# Patient Record
Sex: FEMALE | URBAN - METROPOLITAN AREA
[De-identification: names, ages, dates, MRNs, and addresses within clinical notes are randomized per-mention and may not be internally consistent; named-entity substitution may affect disease eponyms.]

---

## 2018-08-28 ENCOUNTER — APPOINTMENT (RX ONLY)
Dept: URBAN - METROPOLITAN AREA CLINIC 141 | Facility: CLINIC | Age: 58
Setting detail: DERMATOLOGY
End: 2018-08-28

## 2018-08-28 DIAGNOSIS — Z41.9 ENCOUNTER FOR PROCEDURE FOR PURPOSES OTHER THAN REMEDYING HEALTH STATE, UNSPECIFIED: ICD-10-CM

## 2018-08-28 PROCEDURE — ? MICRODERMABRASION

## 2018-08-28 ASSESSMENT — LOCATION ZONE DERM
LOCATION ZONE: NECK
LOCATION ZONE: FACE
LOCATION ZONE: NOSE

## 2018-08-28 ASSESSMENT — LOCATION DETAILED DESCRIPTION DERM
LOCATION DETAILED: LEFT SUPERIOR ANTERIOR NECK
LOCATION DETAILED: NASAL SUPRATIP
LOCATION DETAILED: LEFT CHIN
LOCATION DETAILED: INFERIOR MID FOREHEAD

## 2018-08-28 ASSESSMENT — LOCATION SIMPLE DESCRIPTION DERM
LOCATION SIMPLE: CHIN
LOCATION SIMPLE: NOSE
LOCATION SIMPLE: INFERIOR FOREHEAD
LOCATION SIMPLE: LEFT ANTERIOR NECK

## 2018-08-28 NOTE — PROCEDURE: MICRODERMABRASION
Consent: Written consent obtained, risks reviewed including but not limited to crusting, scabbing, blistering, scarring, darker or lighter pigmentary change, bruising, and/or incomplete response.
Post-Care Instructions: I reviewed with the patient in detail post-care instructions. Patient should stay away from the sun and wear sun protection until treated areas are fully healed. Pt also aware no AHA's, Retin A or scrubs for 72 hours. Pt instructed to use spf 30+ every day.
Number Of Passes: 3
Wand: Medium
Detail Level: Zone
Indication: skin texture
Vacuum Pressure Units: inches Hg
Endpoint: mild erythema
Vacuum Pressure: 15
Prep Text: The patients skin was cleaned and prepped. Toner used to decrease Skin. First pass used to remove first layer of dead skin cells. Second and third passes used to treat trouble areas and to remove outermost layer of skin. \\nMicrodermabrasion endpoint. Applied SPF after.
Treatment Number: 1

## 2019-01-18 ENCOUNTER — APPOINTMENT (RX ONLY)
Dept: URBAN - METROPOLITAN AREA CLINIC 141 | Facility: CLINIC | Age: 59
Setting detail: DERMATOLOGY
End: 2019-01-18

## 2019-01-18 DIAGNOSIS — Z41.9 ENCOUNTER FOR PROCEDURE FOR PURPOSES OTHER THAN REMEDYING HEALTH STATE, UNSPECIFIED: ICD-10-CM

## 2019-01-18 PROCEDURE — ? MICRODERMABRASION

## 2019-01-18 ASSESSMENT — LOCATION SIMPLE DESCRIPTION DERM
LOCATION SIMPLE: LEFT FOREHEAD
LOCATION SIMPLE: RIGHT ANTERIOR NECK

## 2019-01-18 ASSESSMENT — LOCATION DETAILED DESCRIPTION DERM
LOCATION DETAILED: LEFT MEDIAL FOREHEAD
LOCATION DETAILED: RIGHT SUPERIOR ANTERIOR NECK

## 2019-01-18 ASSESSMENT — LOCATION ZONE DERM
LOCATION ZONE: FACE
LOCATION ZONE: NECK

## 2019-01-18 NOTE — PROCEDURE: MICRODERMABRASION
Number Of Passes: 3
Treatment Number: 2
Vacuum Pressure Units: inches Hg
Consent: Written consent obtained, risks reviewed including but not limited to crusting, scabbing, blistering, scarring, darker or lighter pigmentary change, bruising, and/or incomplete response.
Prep Text: The patients skin was cleaned and prepped. Toner used to decrease Skin. First pass used to remove first layer of dead skin cells. Second and third passes used to treat trouble areas and to remove outermost layer of skin. \\nMicrodermabrasion endpoint. Applied SPF after.
Vacuum Pressure: 15
Wand: Medium
Post-Care Instructions: I reviewed with the patient in detail post-care instructions. Patient should stay away from the sun and wear sun protection until treated areas are fully healed. Pt also aware no AHA's, Retin A or scrubs for 72 hours. Pt instructed to use spf 30+ every day.
Detail Level: Zone
Endpoint: mild erythema
Indication: skin texture

## 2024-09-27 ENCOUNTER — APPOINTMENT (RX ONLY)
Dept: URBAN - METROPOLITAN AREA CLINIC 142 | Facility: CLINIC | Age: 64
Setting detail: DERMATOLOGY
End: 2024-09-27

## 2024-09-27 DIAGNOSIS — L92.0 GRANULOMA ANNULARE: ICD-10-CM

## 2024-09-27 DIAGNOSIS — L57.8 OTHER SKIN CHANGES DUE TO CHRONIC EXPOSURE TO NONIONIZING RADIATION: ICD-10-CM

## 2024-09-27 DIAGNOSIS — D18.0 HEMANGIOMA: ICD-10-CM

## 2024-09-27 DIAGNOSIS — D22 MELANOCYTIC NEVI: ICD-10-CM

## 2024-09-27 DIAGNOSIS — L81.4 OTHER MELANIN HYPERPIGMENTATION: ICD-10-CM

## 2024-09-27 DIAGNOSIS — L82.1 OTHER SEBORRHEIC KERATOSIS: ICD-10-CM

## 2024-09-27 PROBLEM — D22.5 MELANOCYTIC NEVI OF TRUNK: Status: ACTIVE | Noted: 2024-09-27

## 2024-09-27 PROBLEM — D18.01 HEMANGIOMA OF SKIN AND SUBCUTANEOUS TISSUE: Status: ACTIVE | Noted: 2024-09-27

## 2024-09-27 PROCEDURE — ? PRESCRIPTION

## 2024-09-27 PROCEDURE — ? TREATMENT REGIMEN

## 2024-09-27 PROCEDURE — ? COUNSELING

## 2024-09-27 PROCEDURE — 99214 OFFICE O/P EST MOD 30 MIN: CPT

## 2024-09-27 PROCEDURE — ? IN-HOUSE DISPENSING PHARMACY

## 2024-09-27 PROCEDURE — ? INVENTORY

## 2024-09-27 RX ORDER — CLOBETASOL PROPIONATE 0.5 MG/G
OINTMENT TOPICAL
Qty: 60 | Refills: 4 | Status: ERX

## 2024-09-27 ASSESSMENT — LOCATION SIMPLE DESCRIPTION DERM
LOCATION SIMPLE: SUPERIOR FOREHEAD
LOCATION SIMPLE: RIGHT FOREARM
LOCATION SIMPLE: LEFT LOWER BACK
LOCATION SIMPLE: LEFT LOWER LEG
LOCATION SIMPLE: RIGHT LOWER LEG
LOCATION SIMPLE: LEFT POSTERIOR UPPER ARM
LOCATION SIMPLE: RIGHT POSTERIOR UPPER ARM
LOCATION SIMPLE: LEFT SHOULDER
LOCATION SIMPLE: RIGHT SHOULDER
LOCATION SIMPLE: RIGHT UPPER BACK

## 2024-09-27 ASSESSMENT — LOCATION DETAILED DESCRIPTION DERM
LOCATION DETAILED: RIGHT INFERIOR UPPER BACK
LOCATION DETAILED: LEFT LATERAL DISTAL CALF
LOCATION DETAILED: SUPERIOR MID FOREHEAD
LOCATION DETAILED: RIGHT PROXIMAL DORSAL FOREARM
LOCATION DETAILED: RIGHT POSTERIOR SHOULDER
LOCATION DETAILED: RIGHT MID-UPPER BACK
LOCATION DETAILED: RIGHT DISTAL POSTERIOR UPPER ARM
LOCATION DETAILED: LEFT POSTERIOR SHOULDER
LOCATION DETAILED: RIGHT LATERAL DISTAL CALF
LOCATION DETAILED: LEFT DISTAL POSTERIOR UPPER ARM
LOCATION DETAILED: LEFT INFERIOR MEDIAL MIDBACK

## 2024-09-27 ASSESSMENT — LOCATION ZONE DERM
LOCATION ZONE: TRUNK
LOCATION ZONE: LEG
LOCATION ZONE: ARM
LOCATION ZONE: FACE

## 2024-09-27 NOTE — PROCEDURE: TREATMENT REGIMEN
Modify Regimen: Increase clobetasol ointment to twice a day every day until no longer raised then as needed to maintain
Detail Level: Simple
Plan: Recommended radiofrequency microneedling for tightening. Also discussed CO2 laser with Dr. Monahan for resurfacing and filler with [facility] for replacement of volume loss
Detail Level: Zone

## 2024-09-27 NOTE — PROCEDURE: IN-HOUSE DISPENSING PHARMACY
Product 56 Price/Unit (In Dollars): 0
Product 69 Unit Type: mg
Name Of Product 4: SKNV iodoquinol/ketoconazole/hydrocortisone compound
Product 1 Application Directions: apply to dark areas at bedtime for up to 3 months, take 1 month off, repeat as needed
Product 4 Application Directions: apply to affected area(s) twice a day
Product 1 Unit Type: grams
Name Of Product 2: SKNV Tretinoin 0.1% compound
Detail Level: Zone
Product 2 Refills: 4
Send Charges To Patient Encounter: Yes
Product 2 Amount/Unit (Numbers Only): 30
Render Product Pricing In Note: No
Product 3 Application Directions: apply to affected area(s) at bedtime as tolerated
Name Of Product 1: SKNV Hydroquinone compound
Product 1 Refills: 1
Product 2 Application Directions: apply to affected areas at bedtime as tolerated
Product 3 Refills: 2
Name Of Product 3: Tretinoin 0.05% w/ Niacinamide 4%

## 2025-01-17 ENCOUNTER — APPOINTMENT (OUTPATIENT)
Dept: URBAN - METROPOLITAN AREA CLINIC 142 | Facility: CLINIC | Age: 65
Setting detail: DERMATOLOGY
End: 2025-01-17

## 2025-01-17 DIAGNOSIS — L57.8 OTHER SKIN CHANGES DUE TO CHRONIC EXPOSURE TO NONIONIZING RADIATION: ICD-10-CM

## 2025-01-17 DIAGNOSIS — L92.0 GRANULOMA ANNULARE: ICD-10-CM

## 2025-01-17 PROCEDURE — ? IN-HOUSE DISPENSING PHARMACY

## 2025-01-17 PROCEDURE — ? PRESCRIPTION

## 2025-01-17 PROCEDURE — ? TREATMENT REGIMEN

## 2025-01-17 PROCEDURE — 99214 OFFICE O/P EST MOD 30 MIN: CPT

## 2025-01-17 PROCEDURE — ? INVENTORY

## 2025-01-17 PROCEDURE — ? COUNSELING

## 2025-01-17 RX ORDER — ROFLUMILAST 500 UG/1
TABLET ORAL
Qty: 30 | Refills: 2 | Status: ERX | COMMUNITY
Start: 2025-01-17

## 2025-01-17 RX ADMIN — ROFLUMILAST: 500 TABLET ORAL at 00:00

## 2025-01-17 ASSESSMENT — LOCATION DETAILED DESCRIPTION DERM
LOCATION DETAILED: SUPERIOR MID FOREHEAD
LOCATION DETAILED: LEFT LATERAL DISTAL CALF
LOCATION DETAILED: RIGHT POSTERIOR SHOULDER
LOCATION DETAILED: LEFT POSTERIOR SHOULDER
LOCATION DETAILED: RIGHT LATERAL DISTAL CALF
LOCATION DETAILED: LEFT DISTAL POSTERIOR UPPER ARM
LOCATION DETAILED: RIGHT DISTAL POSTERIOR UPPER ARM

## 2025-01-17 ASSESSMENT — LOCATION ZONE DERM
LOCATION ZONE: LEG
LOCATION ZONE: FACE
LOCATION ZONE: ARM

## 2025-01-17 ASSESSMENT — LOCATION SIMPLE DESCRIPTION DERM
LOCATION SIMPLE: RIGHT SHOULDER
LOCATION SIMPLE: RIGHT POSTERIOR UPPER ARM
LOCATION SIMPLE: SUPERIOR FOREHEAD
LOCATION SIMPLE: LEFT LOWER LEG
LOCATION SIMPLE: RIGHT LOWER LEG
LOCATION SIMPLE: LEFT SHOULDER
LOCATION SIMPLE: LEFT POSTERIOR UPPER ARM

## 2025-01-17 NOTE — PROCEDURE: IN-HOUSE DISPENSING PHARMACY
Product 52 Units Dispensed: 0
Product 2 Unit Type: grams
Product 40 Unit Type: mg
Name Of Product 3: Tretinoin 0.05% w/ Niacinamide 4%
Product 3 Application Directions: apply to affected area(s) at bedtime as tolerated
Product 1 Refills: 1
Product 1 Amount/Unit (Numbers Only): 30
Name Of Product 2: SKNV Tretinoin 0.1% compound
Product 2 Application Directions: apply to affected areas at bedtime as tolerated
Render Refills If Set To 0: Yes
Name Of Product 1: SKNV Hydroquinone compound
Product 3 Refills: 2
Product 1 Application Directions: apply to dark areas at bedtime for up to 3 months, take 1 month off, repeat as needed
Name Of Product 4: SKNV iodoquinol/ketoconazole/hydrocortisone compound
Detail Level: Zone
Product 4 Application Directions: apply to affected area(s) twice a day
Product 2 Refills: 3
Render Product Pricing In Note: No

## 2025-01-17 NOTE — PROCEDURE: TREATMENT REGIMEN
Modify Regimen: clobetasol ointment twice a day every day until no longer raised then as needed to maintain
Detail Level: Simple
Initiate Treatment: roflumilast 500 mcg - 1/2 tablet daily for 1 month then increase to 1 daily
Continue Regimen: SKNV Tretinoin 0.1% compound at bedtime
Detail Level: Zone

## 2025-06-06 ENCOUNTER — RX ONLY (RX ONLY)
Age: 65
End: 2025-06-06

## 2025-06-06 RX ORDER — CLOBETASOL PROPIONATE 0.5 MG/G
OINTMENT TOPICAL
Qty: 60 | Refills: 0 | Status: ERX

## 2025-07-09 ENCOUNTER — RX ONLY (RX ONLY)
Age: 65
End: 2025-07-09

## 2025-07-09 RX ORDER — TRETIONIN 1 MG/G
CREAM TOPICAL
Qty: 45 | Refills: 4 | Status: ERX | COMMUNITY
Start: 2025-07-09